# Patient Record
Sex: FEMALE | Race: WHITE | NOT HISPANIC OR LATINO | URBAN - METROPOLITAN AREA
[De-identification: names, ages, dates, MRNs, and addresses within clinical notes are randomized per-mention and may not be internally consistent; named-entity substitution may affect disease eponyms.]

---

## 2018-10-05 ENCOUNTER — APPOINTMENT (OUTPATIENT)
Dept: RADIOLOGY | Facility: MEDICAL CENTER | Age: 53
End: 2018-10-05
Payer: COMMERCIAL

## 2018-10-05 ENCOUNTER — TRANSCRIBE ORDERS (OUTPATIENT)
Dept: ADMINISTRATIVE | Facility: HOSPITAL | Age: 53
End: 2018-10-05

## 2018-10-05 DIAGNOSIS — M79.10 MUSCLE PAIN: ICD-10-CM

## 2018-10-05 DIAGNOSIS — M99.03 SOMATIC DYSFUNCTION OF LUMBAR REGION: ICD-10-CM

## 2018-10-05 DIAGNOSIS — M79.644 PAIN IN FINGER OF RIGHT HAND: ICD-10-CM

## 2018-10-05 DIAGNOSIS — M99.04 SOMATIC DYSFUNCTION OF SACRAL REGION: ICD-10-CM

## 2018-10-05 DIAGNOSIS — M79.10 MUSCLE PAIN: Primary | ICD-10-CM

## 2018-10-05 PROCEDURE — 72170 X-RAY EXAM OF PELVIS: CPT

## 2018-10-05 PROCEDURE — 73140 X-RAY EXAM OF FINGER(S): CPT

## 2018-10-05 PROCEDURE — 72110 X-RAY EXAM L-2 SPINE 4/>VWS: CPT

## 2018-10-05 PROCEDURE — 72050 X-RAY EXAM NECK SPINE 4/5VWS: CPT

## 2022-06-07 ENCOUNTER — OFFICE VISIT (OUTPATIENT)
Dept: URGENT CARE | Facility: CLINIC | Age: 57
End: 2022-06-07
Payer: COMMERCIAL

## 2022-06-07 VITALS — OXYGEN SATURATION: 98 % | RESPIRATION RATE: 16 BRPM | HEART RATE: 74 BPM | TEMPERATURE: 99 F

## 2022-06-07 DIAGNOSIS — H65.02 ACUTE SEROUS OTITIS MEDIA OF LEFT EAR, RECURRENCE NOT SPECIFIED: ICD-10-CM

## 2022-06-07 DIAGNOSIS — M26.623 BILATERAL TEMPOROMANDIBULAR JOINT PAIN: ICD-10-CM

## 2022-06-07 DIAGNOSIS — J30.9 ALLERGIC RHINITIS, UNSPECIFIED SEASONALITY, UNSPECIFIED TRIGGER: Primary | ICD-10-CM

## 2022-06-07 PROCEDURE — 99213 OFFICE O/P EST LOW 20 MIN: CPT | Performed by: PHYSICIAN ASSISTANT

## 2022-06-07 RX ORDER — PREDNISONE 20 MG/1
40 TABLET ORAL DAILY
Qty: 10 TABLET | Refills: 0 | Status: SHIPPED | OUTPATIENT
Start: 2022-06-07 | End: 2022-06-12

## 2022-06-07 NOTE — PATIENT INSTRUCTIONS
Allergic Rhinitis   AMBULATORY CARE:   Allergic rhinitis , or hay fever, is swelling of the inside of your nose  The swelling is a reaction to allergens in the air  An allergen can be anything that causes an allergic reaction  Allergies to weeds, grass, trees, or mold often cause seasonal allergic rhinitis  Indoor dust mites, cockroaches, pet dander, or mold can also cause allergic rhinitis  Common signs and symptoms include the following:   Sneezing    Nasal congestion    Runny nose    Itchy nose, eyes, or mouth    Red, watery eyes    Postnasal drip (nasal drainage down the back of your throat)    Cough or frequent throat clearing    Feeling tired or lethargic    Dark circles under your eyes    Call 911 for the following: You have chest pain or shortness of breath  Seek care immediately if:   You have severe pain  You cough up blood  Contact your healthcare provider if:   You have a fever  You have ear or sinus pain, or a headache  Your symptoms get worse, even after treatment  You have yellow, green, brown, or bloody mucus coming from your nose  Your nose is bleeding or you have pain inside your nose  You have trouble sleeping because of your symptoms  You have questions or concerns about your condition or care  Treatment:   Antihistamines  help reduce itching, sneezing, and a runny nose  Some antihistamines can make you sleepy  Nasal steroids  help decrease inflammation in your nose  Decongestants  help clear your stuffy nose  Immunotherapy  may be needed if your symptoms are severe or other treatments do not work  Immunotherapy is used to inject an allergen into your skin  At first, the therapy contains tiny amounts of the allergen  Your healthcare provider will slowly increase the amount of allergen  This may help your body be less sensitive to the allergen and stop reacting to it  You may need immunotherapy for weeks or longer      Manage allergic rhinitis: The best way to manage allergic rhinitis is to avoid allergens that can trigger your symptoms  Any of the following may help decrease your symptoms:  Rinse your nose and sinuses  with a salt water solution or use a salt water nasal spray  This will help thin the mucus in your nose and rinse away pollen and dirt  It will also help reduce swelling so you can breathe normally  Ask your healthcare provider how often to rinse your nose  Reduce exposure to dust mites  Wash sheets and towels in hot water every week  Cover your pillows and mattresses with allergen-free covers  Limit the number of stuffed animals and soft toys your child has  Wash your child's toys in hot water regularly  Vacuum weekly and use a vacuum  with an air filter  If possible, get rid of carpets and curtains  These collect dust and dust mites  Reduce exposure to pollen  Keep windows and doors closed in your house and car  Stay inside when air pollution or the pollen count is high  Run your air conditioner on recycle, and change air filters often  Shower and wash your hair before bed every night to rinse away pollen  Reduce exposure to pet dander  If possible, do not keep cats, dogs, birds, or other pets  If you do keep pets in your home, keep them out of bedrooms and carpeted rooms  Bathe them often  Reduce exposure to mold  Do not spend time in basements  Choose artificial plants instead of live plants  Keep your home's humidity at less than 45%  Do not have ponds or standing water in your home or yard  Do not smoke  Avoid others who smoke  Ask your healthcare provider for information if you currently smoke and need help to quit  Follow up with your doctor as directed:  Write down your questions so you remember to ask them during your visits  © Copyright [employer] 2022 Information is for End User's use only and may not be sold, redistributed or otherwise used for commercial purposes   All illustrations and images included in CareNotes® are the copyrighted property of [employer]THA [employer]  or Chasity Felix   The above information is an  only  It is not intended as medical advice for individual conditions or treatments  Talk to your doctor, nurse or pharmacist before following any medical regimen to see if it is safe and effective for you  Temporomandibular Disorder   WHAT YOU NEED TO KNOW:   What is temporomandibular disorder? Temporomandibular disorder is a condition that causes pain in your jaw  The disorder affects the joint between your temporal bone and your mandible (jawbone)  The muscles and nerves around the joint are also affected  What causes temporomandibular disorder? Dislocation of the cartilage disc in the joint    Deformities of the jaw    Inflammation, infection, arthritis, muscle problems, or tumors in the jaw area     Injury to or fracture of the jawbone    Muscle strain from chewing or teeth clenching or grinding    What are the signs and symptoms of temporomandibular disorder? Popping or grating sound when you open or close your mouth    Headache or pain in your jaw, ear, neck, or face    Pain or swelling of the jaw muscles    Tingling or numbness in the jaw or face    Trouble opening or closing your mouth, or your jaw locks    How is temporomandibular disorder diagnosed? Your healthcare provider will examine your jaw, face, and neck  He will ask you about your health conditions or injuries  You may also need the following tests:  X-rays: You may need to have x-rays of your skull, jaw, or teeth  Arthrogram:  This is an x-ray that uses contrast dye to help the pictures show up better  Tell the healthcare provider if you have ever had an allergic reaction to contrast dye  MRI:  This scan uses powerful magnets and a computer to take pictures of your jaw  You may be given contrast dye to help the pictures show up better   Tell the healthcare provider if you have ever had an allergic reaction to contrast dye  Do not enter the MRI room with anything metal  Metal can cause serious injury  Tell the healthcare provider if you have any metal in or on your body  Bone scan: This is a test done to look at the bones in your body  The bone scan shows areas where your bone is diseased or damaged  You will get a radioactive liquid, called a tracer, through a vein in your arm  The tracer collects in your bones  Pictures will then be taken to look for problems  Examples of bone problems include fractures (breaks) and infection  How is temporomandibular disorder treated? Medicines:      NSAIDs:  These medicines decrease swelling and pain  You can buy NSAIDs without a doctor's order  Ask your healthcare provider which medicine is right for you, and how much to take  Take as directed  NSAIDs can cause stomach bleeding or kidney problems if not taken correctly  Botulinum toxin:  This may be injected into the muscles of your jaw to decrease pain  Steroid medicine: These may be injected into the joint to decrease pain and swelling  Muscle relaxers  help decrease pain and muscle spasms  Surgery: You may need surgery to fix your teeth, jawbone, or the joint  What are the risks of temporomandibular disorder? You may bleed or get an infection if you have surgery  If left untreated, your condition may get worse  You may have trouble breathing, eating, drinking, talking, or opening your mouth  If not treated early, temporomandibular disorder may lead to permanent injury, such as nerve damage, deformity, or paralysis  How can I manage my symptoms? Eat soft foods: Your healthcare provider may suggest that you eat only soft foods for several days  A dietitian may work with you to find foods that are easier to bite, chew, or swallow  Examples are soup, applesauce, cottage cheese, pudding, yogurt, and soft fruits       Use jaw supporting devices:  Splints may be used to support your jaw or keep it from moving  You may need to wear a mouth guard to keep you from clenching or grinding your teeth while you are sleeping  Use ice and heat:  Ice helps decrease swelling and pain  Ice may also help prevent tissue damage  Use an ice pack, or put crushed ice in a plastic bag  Cover it with a towel and place it on your jaw for 15 to 20 minutes every hour or as directed  After the first 24 to 48 hours, use heat to decrease pain, swelling, and muscle spasms  Apply heat on the area for 20 to 30 minutes every 2 hours for as many days as directed  Go to physical therapy:  A physical therapist teaches you exercises to help improve movement and strength, and to decrease pain in your jaw  A speech therapist may help you with swallowing and speech exercises  When should I contact my healthcare provider? You have a fever  Your splint or mouth guard is loose  You have questions or concerns about your condition or care  When should I seek immediate care or call 911? You have nausea, are vomiting, or cannot keep liquids down  You have pain that does not go away even after you take your pain medicine  You have problems breathing, talking, drinking, eating, or swallowing  Your splint or mouth guard gets damaged or broken  CARE AGREEMENT:   You have the right to help plan your care  Learn about your health condition and how it may be treated  Discuss treatment options with your healthcare providers to decide what care you want to receive  You always have the right to refuse treatment  The above information is an  only  It is not intended as medical advice for individual conditions or treatments  Talk to your doctor, nurse or pharmacist before following any medical regimen to see if it is safe and effective for you  © Copyright [facility] 2022 Information is for End User's use only and may not be sold, redistributed or otherwise used for commercial purposes   All illustrations and images included in CareNotes® are the copyrighted property of [employer]  or Chasity Long  Fluid In The Ear (Serous Otitis Media)   AMBULATORY CARE:   Serous otitis media [facility])  is fluid trapped in the middle of your ear behind your eardrum  This condition usually develops without signs or symptoms of an ear infection  Serous otitis media may be caused by an upper respiratory infection or allergies  It is most common in the fall and early spring  Signs and symptoms:   Trouble hearing    Sounds are muffled    Plugged ear or an ear that feels full    Ear discomfort or popping    Ringing or buzzing in your ear    Call your doctor if:   You develop severe ear pain  The outside of your ear is red or swollen  You have fluid coming from your ear  You have questions or concerns about your condition or care  Medicines: You may need any of the following:  Acetaminophen  decreases pain and fever  It is available without a doctor's order  Ask how much to take and how often to take it  Follow directions  Read the labels of all other medicines you are using to see if they also contain acetaminophen, or ask your doctor or pharmacist  Acetaminophen can cause liver damage if not taken correctly  Do not use more than 4 grams (4,000 milligrams) total of acetaminophen in one day  NSAIDs , such as ibuprofen, help decrease swelling, pain, and fever  This medicine is available with or without a doctor's order  NSAIDs can cause stomach bleeding or kidney problems in certain people  If you take blood thinner medicine, always ask your healthcare provider if NSAIDs are safe for you  Always read the medicine label and follow directions  Steroids  help decrease inflammation so the fluid can drain from your ear  Antibiotics  may be needed if a bacterial infection caused your CARMEN  How to stay healthy:   Wash your hands often throughout the day  Use soap and water   Rub your soapy hands together, lacing your fingers, for at least 20 seconds  Rinse with warm, running water  Dry your hands with a clean towel or paper towel  Use hand  that contains alcohol if soap and water are not available  Teach children how to wash their hands and use hand   Avoid people who are sick  Some germs are easily and quickly spread through contact  Follow up with your doctor as directed:  Write down your questions so you remember to ask them during your visits  © Copyright [employer] 2022 Information is for End User's use only and may not be sold, redistributed or otherwise used for commercial purposes  All illustrations and images included in CareNotes® are the copyrighted property of [employer]  or [employer]  The above information is an  only  It is not intended as medical advice for individual conditions or treatments  Talk to your doctor, nurse or pharmacist before following any medical regimen to see if it is safe and effective for you

## 2022-06-24 NOTE — PROGRESS NOTES
[facility]        NAME: Alcon Nolasco is a 65 y o  female  : 1965    MRN: [id number]  DATE: 2022  TIME: 6:48 AM    Assessment and Plan   Allergic rhinitis, unspecified seasonality, unspecified trigger [J30 9]  1  Allergic rhinitis, unspecified seasonality, unspecified trigger  predniSONE 20 mg tablet   2  Acute serous otitis media of left ear, recurrence not specified  predniSONE 20 mg tablet   3  Bilateral temporomandibular joint pain  predniSONE 20 mg tablet         Patient Instructions       Follow up with PCP in 3-5 days  Proceed to  ER if symptoms worsen  Chief Complaint     Chief Complaint   Patient presents with    Earache     Bilateral ear pain 2-3 weeks, stabbing sensation in jaw and ears  Today:  "I got really cold and really hot"  Nasal congestion, sneezing, itchy eyes  History of Present Illness       61-year-old female clinic bilateral ear pain that started 2-3 weeks ago  Patient also notes nasal congestion, sneezing, itchy eyes  Patient notes that today she started feeling subjective fevers and chills  Patient notes that she stabbing sensation in her jaw and ears  Review of Systems   Review of Systems   Constitutional: Positive for chills and fever  HENT: Positive for congestion, ear pain, rhinorrhea and sneezing  Eyes: Positive for itching  Gastrointestinal: Negative for abdominal pain, diarrhea, nausea and vomiting  Musculoskeletal: Negative for myalgias  Neurological: Negative for dizziness, light-headedness and headaches  Current Medications     No current outpatient medications on file  Current Allergies     Allergies as of 2022    (No Known Allergies)            The following portions of the patient's history were reviewed and updated as appropriate: allergies, current medications, past family history, past medical history, past social history, past surgical history and problem list      History reviewed   No pertinent past medical history  Past Surgical History:   Procedure Laterality Date    BREAST SURGERY       SECTION         History reviewed  No pertinent family history  Medications have been verified  Objective   Pulse 74   Temp 99 °F (37 2 °C)   Resp 16   SpO2 98%   No LMP recorded  Physical Exam     Physical Exam  Vitals and nursing note reviewed  Constitutional:       General: She is not in acute distress  Appearance: She is well-developed  She is not diaphoretic  HENT:      Head: Normocephalic and atraumatic  Right Ear: Tympanic membrane normal       Left Ear: A middle ear effusion is present  Nose: Mucosal edema, congestion and rhinorrhea present  Mouth/Throat:      Pharynx: Uvula midline  Posterior oropharyngeal erythema (PND) present  Cardiovascular:      Rate and Rhythm: Normal rate and regular rhythm  Pulmonary:      Effort: Pulmonary effort is normal       Breath sounds: Normal breath sounds  Musculoskeletal:         General: Normal range of motion  Skin:     General: Skin is warm and dry  Findings: No rash  Neurological:      Mental Status: She is alert and oriented to person, place, and time

## 2024-08-23 ENCOUNTER — HOSPITAL ENCOUNTER (EMERGENCY)
Facility: HOSPITAL | Age: 59
Discharge: HOME | End: 2024-08-23
Attending: EMERGENCY MEDICINE
Payer: OTHER MISCELLANEOUS

## 2024-08-23 ENCOUNTER — APPOINTMENT (EMERGENCY)
Dept: RADIOLOGY | Facility: HOSPITAL | Age: 59
End: 2024-08-23
Payer: OTHER MISCELLANEOUS

## 2024-08-23 VITALS
TEMPERATURE: 98.4 F | OXYGEN SATURATION: 95 % | HEART RATE: 90 BPM | DIASTOLIC BLOOD PRESSURE: 89 MMHG | RESPIRATION RATE: 16 BRPM | SYSTOLIC BLOOD PRESSURE: 168 MMHG

## 2024-08-23 DIAGNOSIS — M25.569 KNEE PAIN: Primary | ICD-10-CM

## 2024-08-23 DIAGNOSIS — S82.143A TIBIAL PLATEAU FRACTURE: ICD-10-CM

## 2024-08-23 DIAGNOSIS — T14.8XXA CONTUSION: ICD-10-CM

## 2024-08-23 DIAGNOSIS — S93.409A ANKLE SPRAIN: ICD-10-CM

## 2024-08-23 PROCEDURE — 73610 X-RAY EXAM OF ANKLE: CPT

## 2024-08-23 PROCEDURE — 73564 X-RAY EXAM KNEE 4 OR MORE: CPT

## 2024-08-23 PROCEDURE — 99284 EMERGENCY DEPT VISIT MOD MDM: CPT

## 2024-08-23 PROCEDURE — 73590 X-RAY EXAM OF LOWER LEG: CPT

## 2024-08-23 PROCEDURE — 96372 THER/PROPH/DIAG INJ SC/IM: CPT

## 2024-08-23 PROCEDURE — 99284 EMERGENCY DEPT VISIT MOD MDM: CPT | Performed by: EMERGENCY MEDICINE

## 2024-08-23 RX ORDER — NAPROXEN 500 MG/1
500 TABLET ORAL 2 TIMES DAILY WITH MEALS
Qty: 14 TABLET | Refills: 0 | Status: SHIPPED | OUTPATIENT
Start: 2024-08-23

## 2024-08-23 RX ORDER — OXYCODONE HYDROCHLORIDE 5 MG/1
5 TABLET ORAL ONCE
Status: COMPLETED | OUTPATIENT
Start: 2024-08-23 | End: 2024-08-23

## 2024-08-23 RX ORDER — ACETAMINOPHEN 325 MG/1
975 TABLET ORAL ONCE
Status: COMPLETED | OUTPATIENT
Start: 2024-08-23 | End: 2024-08-23

## 2024-08-23 RX ORDER — METHOCARBAMOL 500 MG/1
500 TABLET, FILM COATED ORAL 2 TIMES DAILY
Qty: 20 TABLET | Refills: 0 | Status: SHIPPED | OUTPATIENT
Start: 2024-08-23

## 2024-08-23 RX ORDER — KETOROLAC TROMETHAMINE 30 MG/ML
30 INJECTION, SOLUTION INTRAMUSCULAR; INTRAVENOUS ONCE
Status: COMPLETED | OUTPATIENT
Start: 2024-08-23 | End: 2024-08-23

## 2024-08-23 RX ADMIN — KETOROLAC TROMETHAMINE 30 MG: 30 INJECTION, SOLUTION INTRAMUSCULAR; INTRAVENOUS at 06:26

## 2024-08-23 RX ADMIN — ACETAMINOPHEN 975 MG: 325 TABLET ORAL at 06:26

## 2024-08-23 RX ADMIN — OXYCODONE HYDROCHLORIDE 5 MG: 5 TABLET ORAL at 06:26

## 2024-08-23 NOTE — RESULT ENCOUNTER NOTE
Called patient and informed her of tibial plateau fracture and need to f/u with ortho.  I instructed her to get a locking knee brace from the pharmacy and call ortho today and continue with non weight bearing.  I placed an ambulatory referral to ortho.

## 2024-08-23 NOTE — DISCHARGE INSTRUCTIONS
Take Acetaminophen 2 extra strength tablets every 4-6 hours up to 3 times daily. Do not exceed 3 g in a 24-hour period

## 2024-08-23 NOTE — ED PROVIDER NOTES
History  Chief Complaint   Patient presents with    Fall     Patient to the ED complaining of left knee and ankle pain after a fall at work yesterday.  She took ibuprofen and applied ice without relief. Denies head strike or LOC.     59-year-old female presents for evaluation of ankle and knee pain after falling at work yesterday, dates that she was showing a client around site as she is a  and stepped in a large hole, bruised her shin, ankle and knee.  Has been having pain worse with walking despite using ibuprofen and ice packs, she has been able to ambulate since the fall but has been having to use a walker and putting less weight on the back to the leg.  Is not on any medications no allergies.  did not strike her head, did not lose consciousness        None       No past medical history on file.    Past Surgical History:   Procedure Laterality Date    BREAST SURGERY       SECTION         No family history on file.  I have reviewed and agree with the history as documented.    E-Cigarette/Vaping     E-Cigarette/Vaping Substances     Social History     Tobacco Use    Smoking status: Never    Smokeless tobacco: Never       Review of Systems   Constitutional:  Negative for appetite change and fever.   HENT:  Negative for rhinorrhea and sore throat.    Eyes:  Negative for photophobia and visual disturbance.   Respiratory:  Negative for cough, chest tightness and wheezing.    Cardiovascular:  Negative for chest pain, palpitations and leg swelling.   Gastrointestinal:  Negative for abdominal distention, abdominal pain, blood in stool, constipation and diarrhea.   Genitourinary:  Negative for dysuria, flank pain, frequency, hematuria and urgency.   Musculoskeletal:  Negative for back pain.        Left knee pain  Left ankle pain   Skin:  Negative for rash.   Neurological:  Negative for dizziness, weakness and headaches.   All other systems reviewed and are negative.      Physical Exam  Physical  Exam  Vitals and nursing note reviewed.   Constitutional:       Appearance: She is well-developed.   HENT:      Head: Normocephalic and atraumatic.   Cardiovascular:      Rate and Rhythm: Normal rate and regular rhythm.      Heart sounds: No murmur heard.     No friction rub. No gallop.   Pulmonary:      Effort: Pulmonary effort is normal.      Breath sounds: No wheezing or rales.   Chest:      Chest wall: No tenderness.   Abdominal:      General: There is no distension.      Palpations: Abdomen is soft. There is no mass.      Tenderness: There is no guarding or rebound.   Musculoskeletal:        Legs:       Comments: Tenderness to palpation over left ankle no focal tenderness over medial lateral malleolus,  swelling anterolaterally distally neurovascular intact    Pain with movement of the left knee no joint effusion, no overlying erythema, no swelling   Skin:     General: Skin is warm and dry.   Neurological:      Mental Status: She is alert and oriented to person, place, and time.   Psychiatric:         Mood and Affect: Mood and affect normal.         Vital Signs  ED Triage Vitals   Temperature Pulse Respirations Blood Pressure SpO2   08/23/24 0546 08/23/24 0544 08/23/24 0544 08/23/24 0544 08/23/24 0544   98.4 °F (36.9 °C) 90 16 168/89 95 %      Temp Source Heart Rate Source Patient Position - Orthostatic VS BP Location FiO2 (%)   08/23/24 0546 08/23/24 0544 08/23/24 0544 08/23/24 0544 --   Temporal Monitor Sitting Right arm       Pain Score       08/23/24 0626       6           Vitals:    08/23/24 0544   BP: 168/89   Pulse: 90   Patient Position - Orthostatic VS: Sitting         Visual Acuity      ED Medications  Medications   acetaminophen (TYLENOL) tablet 975 mg (975 mg Oral Given 8/23/24 0626)   ketorolac (TORADOL) injection 30 mg (30 mg Intramuscular Given 8/23/24 0626)   oxyCODONE (ROXICODONE) IR tablet 5 mg (5 mg Oral Given 8/23/24 0626)       Diagnostic Studies  Results Reviewed       None                    XR ankle 3+ views LEFT    (Results Pending)   XR knee 4+ vw left injury    (Results Pending)   XR tibia fibula 2 views RIGHT    (Results Pending)              Procedures  Procedures         ED Course  ED Course as of 08/23/24 0634   Fri Aug 23, 2024   0631 No acute fracture noted, will place in boot, for crutches, Ace wrap applied to the knee for symptomatic relief, otherwise stable for discharge at this time with close follow-up with occupational medicine orthopedic surgery no indication for further visualization or treatment   0634 XR ankle 3+ views LEFT  This study was ordered and independently reviewed by me    No acute findings noted      0634 XR knee 4+ vw left injury  This study was ordered and independently reviewed by me    No acute findings noted      0634 XR tibia fibula 2 views RIGHT  This study was ordered and independently reviewed by me    No acute findings noted                                    SBIRT 22yo+      Flowsheet Row Most Recent Value   Initial Alcohol Screen: US AUDIT-C     1. How often do you have a drink containing alcohol? 0 Filed at: 08/23/2024 0551   2. How many drinks containing alcohol do you have on a typical day you are drinking?  0 Filed at: 08/23/2024 0551   3a. Male UNDER 65: How often do you have five or more drinks on one occasion? 0 Filed at: 08/23/2024 0551   3b. FEMALE Any Age, or MALE 65+: How often do you have 4 or more drinks on one occassion? 0 Filed at: 08/23/2024 0551   Audit-C Score 0 Filed at: 08/23/2024 0551   LANIE: How many times in the past year have you...    Used an illegal drug or used a prescription medication for non-medical reasons? Never Filed at: 08/23/2024 0551                      Medical Decision Making  59-year-old female with injuries while falling at work yesterday, will obtain x-rays to evaluate for fracture likely treat for sprain, follow-up with occupational medicine and orthopedics as needed    Amount and/or Complexity of Data  Reviewed  Radiology: ordered.    Risk  OTC drugs.  Prescription drug management.                 Disposition  Final diagnoses:   Knee pain   Ankle sprain   Contusion     Time reflects when diagnosis was documented in both MDM as applicable and the Disposition within this note       Time User Action Codes Description Comment    8/23/2024  6:21 AM Maribeth Orr [M25.569] Knee pain     8/23/2024  6:21 AM Maribeth Orr [S93.409A] Ankle sprain     8/23/2024  6:21 AM Maribeth Orr [T14.8XXA] Contusion           ED Disposition       ED Disposition   Discharge    Condition   Stable    Date/Time   Fri Aug 23, 2024 0634    Comment   Lanie Thomas discharge to home/self care.                   Follow-up Information       Follow up With Specialties Details Why Contact Info Additional Information     [facility] Emergency Department Emergency Medicine  If symptoms worsen [address] [facility] [address] [phone] [facility] Emergency Department, [address] [place], [place] [address]    [facility] [place]  Schedule an appointment as soon as possible for a visit   [address] [address]  [phone]     [facility] [place] [facility] Schedule an appointment as soon as possible for a visit today  [address] [facility] [address]  [facility] [address] [phone] [facility] [place], [address] [place] [address] [address] [place], [place], [address] [phone]            Patient's Medications   Discharge Prescriptions    METHOCARBAMOL (ROBAXIN) 500 MG TABLET    Take 1 tablet (500 mg total) by mouth 2 (two) times a day       Start Date: 8/23/2024 End Date: --       Order Dose: 500 mg       Quantity: 20 tablet    Refills: 0    NAPROXEN (NAPROSYN) 500 MG TABLET    Take 1 tablet (500 mg total) by mouth 2 (two) times a day  with meals       Start Date: 8/23/2024 End Date: --       Order Dose: 500 mg       Quantity: 14 tablet    Refills: 0       No discharge procedures on file.    PDMP Review       None            ED Provider  Electronically Signed by             Maribeth Orr DO  08/23/24 0634